# Patient Record
Sex: FEMALE | Race: WHITE | NOT HISPANIC OR LATINO | Employment: FULL TIME | URBAN - METROPOLITAN AREA
[De-identification: names, ages, dates, MRNs, and addresses within clinical notes are randomized per-mention and may not be internally consistent; named-entity substitution may affect disease eponyms.]

---

## 2017-01-01 ENCOUNTER — LAB CONVERSION - ENCOUNTER (OUTPATIENT)
Dept: OTHER | Facility: OTHER | Age: 42
End: 2017-01-01

## 2017-01-01 LAB
ADDITIONAL INFORMATION (HISTORICAL): NORMAL
DIAGNOSIS (HISTORICAL): NORMAL
PATHOLOGIST (HISTORICAL): NORMAL
PROCEDURE (HISTORICAL): NORMAL
SITE/SOURCE (HISTORICAL): NORMAL

## 2017-01-13 ENCOUNTER — GENERIC CONVERSION - ENCOUNTER (OUTPATIENT)
Dept: OTHER | Facility: OTHER | Age: 42
End: 2017-01-13

## 2017-01-30 ENCOUNTER — GENERIC CONVERSION - ENCOUNTER (OUTPATIENT)
Dept: OTHER | Facility: OTHER | Age: 42
End: 2017-01-30

## 2017-02-01 ENCOUNTER — GENERIC CONVERSION - ENCOUNTER (OUTPATIENT)
Dept: OTHER | Facility: OTHER | Age: 42
End: 2017-02-01

## 2017-02-08 ENCOUNTER — GENERIC CONVERSION - ENCOUNTER (OUTPATIENT)
Dept: OTHER | Facility: OTHER | Age: 42
End: 2017-02-08

## 2017-02-28 ENCOUNTER — GENERIC CONVERSION - ENCOUNTER (OUTPATIENT)
Dept: OTHER | Facility: OTHER | Age: 42
End: 2017-02-28

## 2018-01-09 NOTE — RESULT NOTES
Message  Reviewed results with pt, she will f/u with her PCP      Signatures   Electronically signed by : ISELA Bright; Feb 26 2017 10:42PM EST                       (Author)

## 2018-01-15 NOTE — RESULT NOTES
Message  Left message for pt to call back, would refer to her PCP for increased creatinine and decreased GFR        Signatures   Electronically signed by : ISELA Ruth; Feb 24 2017  8:30AM EST                       (Author)

## 2018-01-16 NOTE — MISCELLANEOUS
Message  pt called questioning results of biopsy  "" dr Dom Awad tasked asking that she contact pt & discuss results  Active Problems    1  Abnormal uterine bleeding (AUB) (626 9) (N93 9)   2  Bacterial vaginosis (616 10,041 9) (N76 0,B96 89)   3  Encounter for gynecological examination with abnormal finding (V72 31) (Z01 411)   4  Encounter for screening mammogram for malignant neoplasm of breast (V76 12)   (Z12 31)   5  Encounter to discuss test results (V65 49) (Z71 89)   6  Mental health disorder (300 9) (F99)   7  Oral herpes simplex infection (054 2) (B00 2)    Current Meds   1  ALPRAZolam 0 25 MG Oral Tablet; Therapy: (Recorded:26Esf8717) to Recorded   2  MetroNIDAZOLE 0 75 % Vaginal Gel; insert one applicatorful per vagina at HS x 5; Therapy: 37AES2137 to (Evaluate:52Nqg9061)  Requested for: 99VFB6127; Last   Rx:12Ilc2978 Ordered   3  Sertraline HCl - 25 MG Oral Tablet; Therapy: (Recorded:35Lbv2652) to Recorded   4  ValACYclovir HCl - 1 GM Oral Tablet; Therapy: (Recorded:91Rcb6558) to Recorded    Allergies    1   No Known Drug Allergies    Signatures   Electronically signed by : Antione Mcwilliams RN; Jan 30 2017  4:07PM EST                       (Author)

## 2018-01-17 NOTE — MISCELLANEOUS
Message   Recorded as Task   Date: 01/25/2017 04:03 PM, Created By: Tatianna Mcclain   Task Name: Care Coordination   Assigned To: [facility] Team   Regarding Patient: Lucero Barrera, Status: Active   CommentShanda Liter - 25 Jan 2017 4:03 PM     TASK CREATED  Pt  recently lost insurance and cannot afford to pay out of pocket for f/u visit to review EMB results  Could someone please call her at [phone]                                 OR  Task RN box and one of us will call her and give her results and required f/u   ShareeNorma - 30 Jan 2017 1:54 PM     TASK REPLIED TO: Previously Assigned To [facility] Team  Her EMB results came back as proliferative endometrium  No dysplasia and no malignancy  Likely her abnormal uterine bleeding would benefit from having an IUD placed as was discussed at her previous appointment  Would also suggest to patient to meet with our SW and Financial Counselors regarding applying for medical assistance  Uma Jason - 31 Jan 2017 11:59 AM     TASK REASSIGNED: Previously Assigned To [facility] - 01 Feb 2017 8:25 AM     TASK EDITED  attempted to call pt - L/M for pt to return call to [facility] today until 430pm   SeleneJan - 01 Feb 2017 1:15 PM     TASK EDITED  pt returned call,  results given, per dr Mirta Dowling  pt stated "wants burning of inside of uterine lining done, instead of IUD"  suggested pt call c & schedule an apt to discuss alternative method to assist with AUB  Active Problems    1  Abnormal uterine bleeding (AUB) (626 9) (N93 9)   2  Bacterial vaginosis (616 10,041 9) (N76 0,B96 89)   3  Encounter for gynecological examination with abnormal finding (V72 31) (Z01 411)   4  Encounter for screening mammogram for malignant neoplasm of breast (V76 12)   (Z12 31)   5  Encounter to discuss test results (V65 49) (Z71 89)   6  Mental health disorder (300 9) (F99)   7   Oral herpes simplex infection (054 2) (B00 2)    Current Meds   1  ALPRAZolam 0 25 MG Oral Tablet; Therapy: (Recorded:15Vju6763) to Recorded   2  MetroNIDAZOLE 0 75 % Vaginal Gel; insert one applicatorful per vagina at HS x 5; Therapy: 37EPP5118 to (Evaluate:33Ghl7529)  Requested for: 53HQD5506; Last   Rx:95Eoz0112 Ordered   3  Sertraline HCl - 25 MG Oral Tablet; Therapy: (Recorded:74Zav3023) to Recorded   4  ValACYclovir HCl - 1 GM Oral Tablet; Therapy: (Recorded:49Gdi6685) to Recorded    Allergies    1   No Known Drug Allergies    Signatures   Electronically signed by : Conor Coppola RN; Feb 1 2017  1:16PM EST                       (Author)

## 2018-01-17 NOTE — MISCELLANEOUS
Message  returned pts' p c - L/M for pt to call office prior to 430pm today (pt requesting bldwk fax'd to PCP office)      Active Problems    1  Abnormal uterine bleeding (AUB) (626 9) (N93 9)   2  Bacterial vaginosis (616 10,041 9) (N76 0,B96 89)   3  Encounter for gynecological examination with abnormal finding (V72 31) (Z01 411)   4  Encounter for screening mammogram for malignant neoplasm of breast (V76 12)   (Z12 31)   5  Encounter to discuss test results (V65 49) (Z71 89)   6  Mental health disorder (300 9) (F99)   7  Oral herpes simplex infection (054 2) (B00 2)    Current Meds   1  ALPRAZolam 0 25 MG Oral Tablet; Therapy: (Recorded:67Njh7767) to Recorded   2  MetroNIDAZOLE 0 75 % Vaginal Gel; insert one applicatorful per vagina at HS x 5; Therapy: 17RGC9912 to (Evaluate:73Rpe3832)  Requested for: 22ICI6511; Last   Rx:46Rqm0537 Ordered   3  Sertraline HCl - 25 MG Oral Tablet; Therapy: (Recorded:89Caw1567) to Recorded   4  ValACYclovir HCl - 1 GM Oral Tablet; Therapy: (Recorded:53Kcu2734) to Recorded    Allergies    1   No Known Drug Allergies    Signatures   Electronically signed by : Rosanna Lainez RN; Feb 28 2017  1:13PM EST                       (Author)

## 2018-01-18 NOTE — MISCELLANEOUS
Message   Recorded as Task   Date: 01/25/2017 04:03 PM, Created By: Corrina Bradford   Task Name: Care Coordination   Assigned To: [facility] Team   Regarding Patient: Alex Bear, Status: Active   CommentCherlykenny [facility] - 25 Jan 2017 4:03 PM     TASK CREATED  Pt  recently lost insurance and cannot afford to pay out of pocket for f/u visit to review EMB results  Could someone please call her at [phone]                                 OR  Task RN box and one of us will call her and give her results and required f/u   Norma Tolliver - 30 Jan 2017 1:54 PM     TASK REPLIED TO: Previously Assigned To [address] [place] [address] Team  Her EMB results came back as proliferative endometrium  No dysplasia and no malignancy  Likely her abnormal uterine bleeding would benefit from having an IUD placed as was discussed at her previous appointment  Would also suggest to patient to meet with our SW and Financial Counselors regarding applying for medical assistance  Uma Jason - 31 Jan 2017 11:59 AM     TASK REASSIGNED: Previously Assigned To [facility] - 01 Feb 2017 8:25 AM     TASK EDITED  attempted to call pt - L/M for pt to return call to [facility] today until 430pm        Active Problems    1  Abnormal uterine bleeding (AUB) (626 9) (N93 9)   2  Bacterial vaginosis (616 10,041 9) (N76 0,B96 89)   3  Encounter for gynecological examination with abnormal finding (V72 31) (Z01 411)   4  Encounter for screening mammogram for malignant neoplasm of breast (V76 12)   (Z12 31)   5  Encounter to discuss test results (V65 49) (Z71 89)   6  Mental health disorder (300 9) (F99)   7  Oral herpes simplex infection (054 2) (B00 2)    Current Meds   1  ALPRAZolam 0 25 MG Oral Tablet; Therapy: (Recorded:08Vmt9046) to Recorded   2  MetroNIDAZOLE 0 75 % Vaginal Gel; insert one applicatorful per vagina at HS x 5;    Therapy: 91GKL0517 to (Evaluate:94Mst3850)  Requested for: 91QZZ2716; Last   Rx:18Lei6657 Ordered   3  Sertraline HCl - 25 MG Oral Tablet; Therapy: (Recorded:07Gtf5278) to Recorded   4  ValACYclovir HCl - 1 GM Oral Tablet; Therapy: (Recorded:08Qfq7285) to Recorded    Allergies    1   No Known Drug Allergies    Signatures   Electronically signed by : Nava Marroquin RN; Feb 1 2017  8:25AM EST                       (Author)

## 2018-01-18 NOTE — MISCELLANEOUS
Message  Patient was a no show on 1/13/2017 for result fup  Called patient, no answer, left message for patient to call to   reschedule appt  Active Problems    1  Abnormal uterine bleeding (AUB) (626 9) (N93 9)   2  Bacterial vaginosis (616 10,041 9) (N76 0,B96 89)   3  Encounter for gynecological examination with abnormal finding (V72 31) (Z01 411)   4  Encounter for screening mammogram for malignant neoplasm of breast (V76 12)   (Z12 31)   5  Encounter to discuss test results (V65 49) (Z71 89)   6  Mental health disorder (300 9) (F99)   7  Oral herpes simplex infection (054 2) (B00 2)    Current Meds   1  ALPRAZolam 0 25 MG Oral Tablet; Therapy: (Recorded:89Rvl8160) to Recorded   2  MetroNIDAZOLE 0 75 % Vaginal Gel; insert one applicatorful per vagina at HS x 5; Therapy: 52IXZ5274 to (Evaluate:58Abq1302)  Requested for: 21KFY6583; Last   Rx:[id number] Ordered   3  Sertraline HCl - 25 MG Oral Tablet; Therapy: (Recorded:14Hmj9704) to Recorded   4  ValACYclovir HCl - 1 GM Oral Tablet; Therapy: (Recorded:44Egp4186) to Recorded    Allergies    1   No Known Drug Allergies    Signatures   Electronically signed by : Tara Gilbert, ; Jan 13 2017 12:03PM EST                       (Author)

## 2019-04-03 ENCOUNTER — OFFICE VISIT (OUTPATIENT)
Dept: OBGYN CLINIC | Facility: CLINIC | Age: 44
End: 2019-04-03

## 2019-04-03 VITALS
HEART RATE: 98 BPM | WEIGHT: 115 LBS | DIASTOLIC BLOOD PRESSURE: 82 MMHG | BODY MASS INDEX: 18.05 KG/M2 | SYSTOLIC BLOOD PRESSURE: 124 MMHG | HEIGHT: 67 IN

## 2019-04-03 DIAGNOSIS — Z01.419 WOMEN'S ANNUAL ROUTINE GYNECOLOGICAL EXAMINATION: Primary | ICD-10-CM

## 2019-04-03 DIAGNOSIS — Z12.39 BREAST CANCER SCREENING: ICD-10-CM

## 2019-04-03 PROBLEM — F10.10 ETOH ABUSE: Status: ACTIVE | Noted: 2019-03-27

## 2019-04-03 PROCEDURE — 87624 HPV HI-RISK TYP POOLED RSLT: CPT | Performed by: NURSE PRACTITIONER

## 2019-04-03 PROCEDURE — S0612 ANNUAL GYNECOLOGICAL EXAMINA: HCPCS | Performed by: NURSE PRACTITIONER

## 2019-04-03 PROCEDURE — G0145 SCR C/V CYTO,THINLAYER,RESCR: HCPCS | Performed by: NURSE PRACTITIONER

## 2019-04-03 RX ORDER — VALACYCLOVIR HYDROCHLORIDE 500 MG/1
TABLET, FILM COATED ORAL
COMMUNITY

## 2019-04-03 RX ORDER — ALPRAZOLAM 0.25 MG/1
TABLET ORAL
COMMUNITY
Start: 2019-04-02

## 2019-04-04 LAB
HPV HR 12 DNA CVX QL NAA+PROBE: NEGATIVE
HPV16 DNA CVX QL NAA+PROBE: NEGATIVE
HPV18 DNA CVX QL NAA+PROBE: NEGATIVE

## 2019-04-07 LAB
LAB AP GYN PRIMARY INTERPRETATION: NORMAL
Lab: NORMAL
PATH INTERP SPEC-IMP: NORMAL

## 2019-04-29 ENCOUNTER — HOSPITAL ENCOUNTER (OUTPATIENT)
Dept: MAMMOGRAPHY | Facility: MEDICAL CENTER | Age: 44
Discharge: HOME | End: 2019-04-29
Payer: COMMERCIAL

## 2019-04-29 VITALS — BODY MASS INDEX: 18.52 KG/M2 | HEIGHT: 67 IN | WEIGHT: 118 LBS

## 2019-04-29 DIAGNOSIS — Z12.39 BREAST CANCER SCREENING: ICD-10-CM

## 2019-04-29 PROCEDURE — 77063 BREAST TOMOSYNTHESIS BI: CPT

## 2019-04-29 PROCEDURE — 77067 SCR MAMMO BI INCL CAD: CPT

## 2019-07-01 ENCOUNTER — OFFICE VISIT (OUTPATIENT)
Dept: OBGYN CLINIC | Facility: CLINIC | Age: 44
End: 2019-07-01

## 2019-07-01 VITALS
SYSTOLIC BLOOD PRESSURE: 119 MMHG | WEIGHT: 115 LBS | BODY MASS INDEX: 18.48 KG/M2 | DIASTOLIC BLOOD PRESSURE: 66 MMHG | HEART RATE: 87 BPM | HEIGHT: 66 IN

## 2019-07-01 DIAGNOSIS — N93.9 ABNORMAL UTERINE BLEEDING: Primary | ICD-10-CM

## 2019-07-01 PROCEDURE — 88305 TISSUE EXAM BY PATHOLOGIST: CPT | Performed by: PATHOLOGY

## 2019-07-01 PROCEDURE — 58100 BIOPSY OF UTERUS LINING: CPT | Performed by: OBSTETRICS & GYNECOLOGY

## 2019-07-01 PROCEDURE — 99214 OFFICE O/P EST MOD 30 MIN: CPT | Performed by: OBSTETRICS & GYNECOLOGY

## 2019-07-01 RX ORDER — ACETAMINOPHEN AND CODEINE PHOSPHATE 120; 12 MG/5ML; MG/5ML
1 SOLUTION ORAL DAILY
Qty: 30 TABLET | Refills: 2 | Status: SHIPPED | OUTPATIENT
Start: 2019-07-01

## 2019-07-01 NOTE — PROGRESS NOTES
Subjective    Devoria Brunner is a 40 y o  female here for discussion of abnormal uterine bleeding  Menarche started at [age]  She has always had regular menses, lasting 5 days, heavier in the first few days, every 28-30 days up until last summer  Last year, she had intermittent irregular menstrual bleeding  She stated that she was consented for an endometrial ablation but due to difficulty with insurance coverage, did not proceed  Her menstrual cycle regulated so she did not seek further care  Griffiths Marino is here because she has been having menses for the past 2 months, since 2019  It is heavy and filled with clots requiring multiple pad changes per day  She denies dizziness, nausea/vomiting, headache, vision changes, abdominopelvic pain  She is currently not on contraception  She declines all types of contraception today although she is not using protection during intercourse and does not desire pregnancy  Gynecologic History  Patient's last menstrual period was 2019 (lmp unknown)  Contraception: none  Last Pap: 4/3/2019  Results were: normal  Last mammogram: 2019  Results were: normal (biRad 1)    Obstetric History  OB History    Para Term  AB Living   2   0   2 0   SAB TAB Ectopic Multiple Live Births                  # Outcome Date GA Lbr John/2nd Weight Sex Delivery Anes PTL Lv   2 AB            1 AB                Review of Systems  Pertinent items are noted in HPI  Objective  Vitals:    19 1153   BP: 119/66   Pulse: 87       Physical Exam   Constitutional: She is oriented to person, place, and time  She appears well-developed and well-nourished  Cardiovascular: Normal rate, regular rhythm and normal heart sounds  Pulmonary/Chest: Effort normal and breath sounds normal    Abdominal: Soft  Bowel sounds are normal    Genitourinary:   Genitourinary Comments: No abnormalities visualized on external genitalia  Urethra midline  No vaginal lesions, abrasions, masses  Cervix nulliparous in appearance  Dark-red blood pooling the posterior fornix including the vaginal sidewalls  Neurological: She is alert and oriented to person, place, and time  Vitals reviewed  Endometrial biopsy  Date/Time: 7/1/2019 2:47 PM  Performed by: Drew Birch MD  Authorized by: Drew Birch MD     Consent:     Consent obtained:  Verbal and written    Consent given by:  Patient    Procedural risks discussed:  Bleeding, failure rate, infection, possible continued pain and repeat procedure    Patient questions answered: yes      Patient agrees, verbalizes understanding, and wants to proceed: yes      Educational handouts given: no      Instructions and paperwork completed: yes    Indication:     Indications: Other disorder of menstruation and other abnormal bleeding from female genital tract    Pre-procedure:     Pre-procedure timeout performed: yes    Procedure:     Procedure: endometrial biopsy with Pipelle      A bivalve speculum was placed in the vagina: yes      Cervix cleaned and prepped: yes      A paracervical block was performed: no      An intracervical block was performed: no      The cervix was dilated: no      Uterus sounded: yes      Uterus sound depth (cm):  6    Specimen collected: specimen collected and sent to pathology    Findings:     Uterus size:  Non-gravid    Cervix: normal      Adnexa: normal        Assessment    Patient is a 39yo G0 female here for workup of abnormal uterine bleeding  Plan    Problem List Items Addressed This Visit        Genitourinary    Abnormal uterine bleeding - Primary     Causes of abnormal uterine bleeding includes structural abnormalities (poylps, adenomyosis, leiomyoma, malignancy) and nonstructural causes (coagulopathy, ovulatory dysfunction, endometrial, iatrogenic, or not otherwise classified)  Endometrial biopsy completed today to rule out malignancy, will follow up with results   Reviewed labs from 6/26, no sign of anemia or thyroid dysfunction  Patient to return to clinic in 1 week for SIS to visualize structural abnormalities  Since she smokes 1 pack of cigarettes/day, she is not a candidate for combination estrogen/progesterone therapy  Discussed oral contraceptives, depo provera injection, and Mirena as medical options  She would like to start micronor today  Once results of SIS and EMB are back, will re-discuss medication vs surgical options (including endometrial ablation or hysterectomy)  Patient discussed with Dr Lance Soni, who was present for EMB and is in agreement with plan           Relevant Medications    norethindrone (MICRONOR) 0 35 MG tablet    Other Relevant Orders    Tissue Exam

## 2019-07-01 NOTE — ASSESSMENT & PLAN NOTE
Causes of abnormal uterine bleeding includes structural abnormalities (poylps, adenomyosis, leiomyoma, malignancy) and nonstructural causes (coagulopathy, ovulatory dysfunction, endometrial, iatrogenic, or not otherwise classified)  Endometrial biopsy completed today to rule out malignancy, will follow up with results  Reviewed labs from 6/26, no sign of anemia or thyroid dysfunction  Patient to return to clinic in 1 week for SIS to visualize structural abnormalities  Since she smokes 1 pack of cigarettes/day, she is not a candidate for combination estrogen/progesterone therapy  Discussed oral contraceptives, depo provera injection, and Mirena as medical options  She would like to start micronor today  Once results of SIS and EMB are back, will re-discuss medication vs surgical options (including endometrial ablation or hysterectomy)  Patient discussed with Dr Meghna Fierro, who was present for EMB and is in agreement with plan

## 2019-07-08 ENCOUNTER — PROCEDURE VISIT (OUTPATIENT)
Dept: OBGYN CLINIC | Facility: CLINIC | Age: 44
End: 2019-07-08

## 2019-07-08 VITALS
WEIGHT: 114 LBS | SYSTOLIC BLOOD PRESSURE: 128 MMHG | HEART RATE: 96 BPM | DIASTOLIC BLOOD PRESSURE: 83 MMHG | BODY MASS INDEX: 18.32 KG/M2 | HEIGHT: 66 IN

## 2019-07-08 DIAGNOSIS — N93.9 ABNORMAL UTERINE BLEEDING: Primary | ICD-10-CM

## 2019-07-08 PROCEDURE — 58340 CATHETER FOR HYSTEROGRAPHY: CPT | Performed by: OBSTETRICS & GYNECOLOGY

## 2019-07-08 PROCEDURE — 76856 US EXAM PELVIC COMPLETE: CPT | Performed by: OBSTETRICS & GYNECOLOGY

## 2019-07-08 PROCEDURE — 99214 OFFICE O/P EST MOD 30 MIN: CPT | Performed by: OBSTETRICS & GYNECOLOGY

## 2019-07-08 NOTE — ASSESSMENT & PLAN NOTE
SIS completed today, findings as noted above  Will send patient for formal pelvic ultrasound to better define the left ovarian cyst, fibroid, and endometrial cavity  I briefly discussed medical and surgical options pending the ultrasound results including continuing micronor use, depo provera injection, Mirena, endometrial ablation, and hysterectomy  Patient to make an appointment after completion of ultrasound for further discussion

## 2019-07-08 NOTE — PROGRESS NOTES
Subjective:     James Araujo is a 40 y o   female here for continued evaluation of abnormal uterine bleeding  Patient was first evaluated 19  EMB showed disordered proliferative-type endometrium with focal endometrial breakdown/menstrual-type changes with no atypia nor malignancy seen  Patient is here for SIS to assess for structural causes of AUB including polyps, adenomyosis, and leiomyoma  Patient is doing well but still having menses  LMP 19  She started her micronor last week - she has noticed decreased clot production but still has vaginal bleeding  She is very anxious about the procedure today  Objective:    Vitals: Blood pressure 128/83, pulse 96, height 5' 6" (1 676 m), weight 51 7 kg (114 lb), last menstrual period 2019, not currently breastfeeding  Body mass index is 18 4 kg/m²  Physical Exam   Constitutional: She is oriented to person, place, and time  She appears well-developed and well-nourished  Cardiovascular: Normal rate, regular rhythm and normal heart sounds  Pulmonary/Chest: Effort normal    Abdominal: Soft  Bowel sounds are normal  She exhibits no distension and no mass  There is no tenderness  There is no rebound and no guarding  No hernia  Genitourinary:   Genitourinary Comments: Bimanual exam: nulliparous cervix, no palpable masses, negative CMT, no adnexal tenderness or masses   Neurological: She is alert and oriented to person, place, and time        Sonohysterogram  Date/Time: 2019 1:36 PM  Performed by: Davin Herbert MD  Authorized by: Davin Herbert MD     Consent:     Consent obtained:  Verbal and written    Consent given by:  Patient    Procedural risks discussed:  Bleeding, failure rate, infection, possible continued pain and repeat procedure    Patient questions answered: yes      Patient agrees, verbalizes understanding, and wants to proceed: yes      Instructions and paperwork completed: yes    Pre-procedure:     Pre-procedure timeout performed: yes    Procedure:     Cervix cleaned and prepped: yes      Cervix dilated: no      Tenaculum applied to cervix: no      Uterus sounded: no      Catheter inserted: yes      Uterine cavity distended with saline: yes    Post-procedure:     Patient observed: yes      Patient observation time:  10 minutes    No complications: no      Estimated blood loss (mL):  0    Post procedure instructions given to patient: yes      Nothing in vagina for 2 weeks: no      Patient tolerated procedure well with no complications: yes        SIS findings: Thin endometrial lining 5 3mm  5 15 x 6 52cm posterior fibroid   Left ovarian cyst, thin walled with septations, measuring 2 64x3 91cm  No abnormalities noted on right ovary  No apparent polyps or submucosal fibroids visualized    Assessment/Plan:    Problem List Items Addressed This Visit        Genitourinary    Abnormal uterine bleeding - Primary     SIS completed today, findings as noted above  Will send patient for formal pelvic ultrasound to better define the left ovarian cyst, fibroid, and endometrial cavity  I briefly discussed medical and surgical options pending the ultrasound results including continuing micronor use, depo provera injection, Mirena, endometrial ablation, and hysterectomy  Patient to make an appointment after completion of ultrasound for further discussion           Relevant Orders    US pelvis complete non OB          Dr Kelsi Price present during SIS and counseling regarding management options     Salima Macedo MD  7/8/2019  1:53 PM

## 2019-07-15 ENCOUNTER — HOSPITAL ENCOUNTER (OUTPATIENT)
Dept: RADIOLOGY | Facility: HOSPITAL | Age: 44
Discharge: HOME | End: 2019-07-15
Payer: COMMERCIAL

## 2019-07-15 DIAGNOSIS — N93.9 ABNORMAL UTERINE BLEEDING: ICD-10-CM

## 2019-07-15 PROCEDURE — 76856 US EXAM PELVIC COMPLETE: CPT

## 2019-07-15 PROCEDURE — 76830 TRANSVAGINAL US NON-OB: CPT

## 2019-07-19 DIAGNOSIS — N83.8 OVARIAN MASS, LEFT: Primary | ICD-10-CM

## 2019-07-22 ENCOUNTER — OFFICE VISIT (OUTPATIENT)
Dept: OBGYN CLINIC | Facility: CLINIC | Age: 44
End: 2019-07-22

## 2019-07-22 VITALS
DIASTOLIC BLOOD PRESSURE: 80 MMHG | HEIGHT: 66 IN | BODY MASS INDEX: 18.8 KG/M2 | SYSTOLIC BLOOD PRESSURE: 131 MMHG | WEIGHT: 117 LBS | HEART RATE: 91 BPM

## 2019-07-22 DIAGNOSIS — N83.202 LEFT OVARIAN CYST: Primary | ICD-10-CM

## 2019-07-22 DIAGNOSIS — Z71.2 ENCOUNTER TO DISCUSS TEST RESULTS: ICD-10-CM

## 2019-07-22 PROCEDURE — 99213 OFFICE O/P EST LOW 20 MIN: CPT | Performed by: OBSTETRICS & GYNECOLOGY

## 2019-07-22 NOTE — PROGRESS NOTES
I reviewed the pelvic ultrasound that was completed 7/15/2019  Notable findings:  Uterus 9 4x4  3x4 8cm  Large right sided uterine fibroid measuring 5 4x4 6x6cm  Posterior uterine fibroids measuring 1x0 9x0 9cm and 1 8x1 6x1cm  Normal endometrial caliber of 5mm  Normal right ovary    Left ovary 5 4x5 8x5 4cm with indeterminate cyst measuring 5 1x5 3x4 1cm containing a solid component  I reviewed the ultrasound findings with Dr Meghna Fierro  Patient needs to make an appointment to discuss removal of left adnexa given ultrasound findings  Given that she presented for year long history of abnormal uterine bleeding with multiple fibroids on ultrasound, discussion regarding hysterectomy should be considered  No further imaging needs to be completed as it will not change the course of management  Front staff tasked with setting up appointment       Don Hernández MD  OBGYN, PGY-3  7/22/2019  9:19 AM

## 2019-09-13 DIAGNOSIS — N93.9 ABNORMAL UTERINE BLEEDING: ICD-10-CM

## 2019-09-17 RX ORDER — NORETHINDRONE 0.35 MG/1
TABLET ORAL
Qty: 84 TABLET | Refills: 0 | OUTPATIENT
Start: 2019-09-17

## 2019-09-18 ENCOUNTER — OFFICE VISIT (OUTPATIENT)
Dept: OBGYN CLINIC | Facility: CLINIC | Age: 44
End: 2019-09-18

## 2019-09-18 VITALS
SYSTOLIC BLOOD PRESSURE: 121 MMHG | DIASTOLIC BLOOD PRESSURE: 75 MMHG | HEART RATE: 91 BPM | WEIGHT: 122 LBS | BODY MASS INDEX: 19.61 KG/M2 | HEIGHT: 66 IN

## 2019-09-18 DIAGNOSIS — N83.202 LEFT OVARIAN CYST: Primary | ICD-10-CM

## 2019-09-18 DIAGNOSIS — F17.200 TOBACCO USE DISORDER: ICD-10-CM

## 2019-09-18 DIAGNOSIS — F10.10 ETOH ABUSE: ICD-10-CM

## 2019-09-18 DIAGNOSIS — N93.9 ABNORMAL UTERINE BLEEDING: ICD-10-CM

## 2019-09-18 PROCEDURE — 99214 OFFICE O/P EST MOD 30 MIN: CPT | Performed by: OBSTETRICS & GYNECOLOGY

## 2019-09-18 NOTE — H&P (VIEW-ONLY)
H&P Exam - Gynecology   Serjio Jaimes 40 y o  female MRN: [id number]  Unit/Bed#:  Encounter: [id number]      History of Present Illness     HPI:  Serjio Jaimes is a 40 y o  female who presents with left ovarian mass with solid component which was detected incidentally on transvaginal ultrasound  Patient  was 4  Patient primary complaint once menorrhagia which started on May 2019 and similar complaints was a year ago for 2 months  Patient is complaining of lower abdominal pain and cramping when she is having bleeding  Micronor has been prescribed for menorrhagia which is helping for reducing the bleeding  She decline menorrhagia currently and lesser pelvic pain  Patient has had endometrial biopsy which was reported as proliferative endometrium  Patient Pap smear on  was reported as negative for intraepithelial neoplasia  Laparoscopic left cystectomy or left salpingo-oophorectomy and bilateral salpingectomy planned patient understand the possible risk for infection, bleeding, possible neighbor organ damage, nerve injury, and possible extra surgery to repair the damage  Possible open surgery  Review of Systems   Constitutional: Negative  HENT: Negative  Respiratory: Negative  Cardiovascular: Negative  Gastrointestinal: Negative  Genitourinary: Negative  Musculoskeletal: Negative  Neurological: Negative  Psychiatric/Behavioral: Negative          Historical Information   Past Medical History:   Diagnosis Date    Anxiety disorder     HSV infection      Past Surgical History:   Procedure Laterality Date    THORACOTOMY Left      OB/GYN History:   Family History   Problem Relation Age of Onset   Wilkinson Drop COPD Mother    Wilkinson Drop Stroke Father     Hyperlipidemia Father     Hypertension Father     Heart attack Father     No Known Problems Brother     Cancer Paternal Aunt      Social History   Social History     Substance and Sexual Activity   Alcohol Use Yes    Frequency: 2-3 times a week     Social History     Substance and Sexual Activity   Drug Use Never     Social History     Tobacco Use   Smoking Status Current Some Day Smoker    Packs/day: 1 00    Types: Cigarettes   Smokeless Tobacco Never Used       Meds/Allergies     (Not in a hospital admission)  No Known Allergies    Objective   There were no vitals taken for this visit  [unfilled]    Physical Exam   Constitutional: She is oriented to person, place, and time  She appears well-developed and well-nourished  No distress  Cardiovascular: Normal rate and regular rhythm  Pulmonary/Chest: Effort normal    Abdominal: Soft  Musculoskeletal: Normal range of motion  Neurological: She is alert and oriented to person, place, and time  Skin: Skin is warm  She is not diaphoretic  Psychiatric: She has a normal mood and affect  Her behavior is normal        Lab Results:   No visits with results within 1 Day(s) from this visit  Latest known visit with results is:   Office Visit on 07/01/2019   Component Date Value    Case Report 07/01/2019                      Value:Surgical Pathology Report                         Case: [id number]                                   Authorizing Provider:  Nataliya Kellogg MD            Collected:           07/01/2019 1244              Ordering Location:     [facility]      Received:            07/01/2019 [address] [place] [address]                                                             Pathologist:           Adan Gilmore MD                                                            Specimen:    Endometrium, EMB                                                                           Final Diagnosis 07/01/2019                      Value: This result contains rich text formatting which cannot be displayed here   Additional Information 07/01/2019                      Value: This result contains rich text formatting which cannot be displayed here      Gross Description 07/01/2019                      Value: This result contains rich text formatting which cannot be displayed here   Clinical Information 07/01/2019                      Value:AUB      Imaging: I have personally reviewed pertinent reports  EKG, Pathology, and Other Studies: I have personally reviewed pertinent reports        Assessment/Plan     A/P:  Left-sided adnexal mass with solid component, menorrhagia  - laparoscopic surgery scheduled with indication of left-sided adnexal mass with solid component, Laparoscopic bilateral salpingectomy, left cystectomy or left salpingo-oophorectomy  - surgical consent signed by patient, all questions answered  - patient will continue use Micronor for menorrhagia, MIRENA  intrauterine device discussed for a long-term option  - tobacco use; cessation of smoking recommended, anesthesia related complication with the surgery discussed  - alcohol abuse; alcohol cessation recommended    Code Status: full code    María Valladares MD  8/42/5469  4:96 PM

## 2019-09-18 NOTE — PROGRESS NOTES
H&P Exam - Gynecology   Nikky Tracey 40 y o  female MRN: [id number]  Unit/Bed#:  Encounter: [id number]      History of Present Illness     HPI:  Nikky Tracey is a 40 y o  female who presents with left ovarian mass with solid component which was detected incidentally on transvaginal ultrasound  Patient  was 4  Patient primary complaint once menorrhagia which started on May 2019 and similar complaints was a year ago for 2 months  Patient is complaining of lower abdominal pain and cramping when she is having bleeding  Micronor has been prescribed for menorrhagia which is helping for reducing the bleeding  She decline menorrhagia currently and lesser pelvic pain  Patient has had endometrial biopsy which was reported as proliferative endometrium  Patient Pap smear on  was reported as negative for intraepithelial neoplasia  Laparoscopic left cystectomy or left salpingo-oophorectomy and bilateral salpingectomy planned patient understand the possible risk for infection, bleeding, possible neighbor organ damage, nerve injury, and possible extra surgery to repair the damage  Possible open surgery  Review of Systems   Constitutional: Negative  HENT: Negative  Respiratory: Negative  Cardiovascular: Negative  Gastrointestinal: Negative  Genitourinary: Negative  Musculoskeletal: Negative  Neurological: Negative  Psychiatric/Behavioral: Negative          Historical Information   Past Medical History:   Diagnosis Date    Anxiety disorder     HSV infection      Past Surgical History:   Procedure Laterality Date    THORACOTOMY Left      OB/GYN History:   Family History   Problem Relation Age of Onset   Aleja Wong COPD Mother    Aleja Wong Stroke Father     Hyperlipidemia Father     Hypertension Father     Heart attack Father     No Known Problems Brother     Cancer Paternal Aunt      Social History   Social History     Substance and Sexual Activity   Alcohol Use Yes    Frequency: 2-3 times a week     Social History     Substance and Sexual Activity   Drug Use Never     Social History     Tobacco Use   Smoking Status Current Some Day Smoker    Packs/day: 1 00    Types: Cigarettes   Smokeless Tobacco Never Used       Meds/Allergies     (Not in a hospital admission)  No Known Allergies    Objective   There were no vitals taken for this visit  [unfilled]    Physical Exam   Constitutional: She is oriented to person, place, and time  She appears well-developed and well-nourished  No distress  Cardiovascular: Normal rate and regular rhythm  Pulmonary/Chest: Effort normal    Abdominal: Soft  Musculoskeletal: Normal range of motion  Neurological: She is alert and oriented to person, place, and time  Skin: Skin is warm  She is not diaphoretic  Psychiatric: She has a normal mood and affect  Her behavior is normal        Lab Results:   No visits with results within 1 Day(s) from this visit  Latest known visit with results is:   Office Visit on 07/01/2019   Component Date Value    Case Report 07/01/2019                      Value:Surgical Pathology Report                         Case: [id number]                                   Authorizing Provider:  Rom Prabhakar MD            Collected:           07/01/2019 1244              Ordering Location:     [facility]      Received:            07/01/2019 [address] [place] [address]                                                             Pathologist:           Pretty Powell MD                                                            Specimen:    Endometrium, EMB                                                                           Final Diagnosis 07/01/2019                      Value: This result contains rich text formatting which cannot be displayed here   Additional Information 07/01/2019                      Value: This result contains rich text formatting which cannot be displayed here      Gross Description 07/01/2019                      Value: This result contains rich text formatting which cannot be displayed here   Clinical Information 07/01/2019                      Value:AUB      Imaging: I have personally reviewed pertinent reports  EKG, Pathology, and Other Studies: I have personally reviewed pertinent reports        Assessment/Plan     A/P:  Left-sided adnexal mass with solid component, menorrhagia  - laparoscopic surgery scheduled with indication of left-sided adnexal mass with solid component, Laparoscopic bilateral salpingectomy, left cystectomy or left salpingo-oophorectomy  - surgical consent signed by patient, all questions answered  - patient will continue use Micronor for menorrhagia, MIRENA  intrauterine device discussed for a long-term option  - tobacco use; cessation of smoking recommended, anesthesia related complication with the surgery discussed  - alcohol abuse; alcohol cessation recommended    Code Status: full code    Georgette Orosco MD  9/75/1254  7:72 PM

## 2019-10-08 NOTE — PRE-PROCEDURE INSTRUCTIONS
Pre-Surgery Instructions:   Medication Instructions    ALPRAZolam (XANAX) 0 25 mg tablet Instructed patient per Anesthesia Guidelines   norethindrone (MICRONOR) 0 35 MG tablet Instructed patient per Anesthesia Guidelines   sertraline (ZOLOFT) 50 mg tablet Instructed patient per Anesthesia Guidelines  Spoke to pt  Medication list reviewed & instructed   As of 10/8 instructed on tylenol only   Am DOS pt ok to take am meds with 1-2 sips of water   Showering instructions given by surgeon office, reviewed   Smoking cessation education provided  Advised no smoking am DOS  Advised no alcohol 24 hour prior  Pt understands she will need a ride home @ time of discharge  All instructions verbally understood by patient  No further question     Acyclovir Med Class     Continue to take this medication on your normal schedule  If this is an oral medication and you take it in the morning, then you may take this medicine with a sip of water  Antidepressant Med Class     Continue to take this medication on your normal schedule  If this is an oral medication and you take it in the morning, then you may take this medicine with a sip of water  Benzodiazepine antagonist Med Class     If this medication is needed please continue to take on your normal schedule  If you take it in the morning, then you may take this medicine with a sip of water

## 2019-10-10 ENCOUNTER — HOSPITAL ENCOUNTER (OUTPATIENT)
Facility: HOSPITAL | Age: 44
Setting detail: OUTPATIENT SURGERY
Discharge: HOME | End: 2019-10-10
Attending: OBSTETRICS & GYNECOLOGY | Admitting: OBSTETRICS & GYNECOLOGY
Payer: COMMERCIAL

## 2019-10-10 ENCOUNTER — ANESTHESIA EVENT (OUTPATIENT)
Dept: PERIOP | Facility: HOSPITAL | Age: 44
End: 2019-10-10
Payer: COMMERCIAL

## 2019-10-10 ENCOUNTER — ANESTHESIA (OUTPATIENT)
Dept: PERIOP | Facility: HOSPITAL | Age: 44
End: 2019-10-10
Payer: COMMERCIAL

## 2019-10-10 VITALS
DIASTOLIC BLOOD PRESSURE: 76 MMHG | HEART RATE: 89 BPM | OXYGEN SATURATION: 97 % | RESPIRATION RATE: 18 BRPM | WEIGHT: 116 LBS | BODY MASS INDEX: 22.78 KG/M2 | SYSTOLIC BLOOD PRESSURE: 114 MMHG | HEIGHT: 60 IN | TEMPERATURE: 99.7 F

## 2019-10-10 DIAGNOSIS — N83.202 LEFT OVARIAN CYST: Primary | ICD-10-CM

## 2019-10-10 LAB
EXT PREGNANCY TEST URINE: NEGATIVE
EXT. CONTROL: NORMAL

## 2019-10-10 PROCEDURE — 58661 LAPAROSCOPY REMOVE ADNEXA: CPT | Performed by: OBSTETRICS & GYNECOLOGY

## 2019-10-10 PROCEDURE — 88305 TISSUE EXAM BY PATHOLOGIST: CPT | Performed by: PATHOLOGY

## 2019-10-10 PROCEDURE — 81025 URINE PREGNANCY TEST: CPT | Performed by: OBSTETRICS & GYNECOLOGY

## 2019-10-10 PROCEDURE — 88112 CYTOPATH CELL ENHANCE TECH: CPT | Performed by: PATHOLOGY

## 2019-10-10 RX ORDER — ACETAMINOPHEN 325 MG/1
650 TABLET ORAL EVERY 4 HOURS PRN
Status: DISCONTINUED | OUTPATIENT
Start: 2019-10-10 | End: 2019-10-10 | Stop reason: HOSPADM

## 2019-10-10 RX ORDER — LIDOCAINE HYDROCHLORIDE 10 MG/ML
INJECTION, SOLUTION INFILTRATION; PERINEURAL AS NEEDED
Status: DISCONTINUED | OUTPATIENT
Start: 2019-10-10 | End: 2019-10-10 | Stop reason: SURG

## 2019-10-10 RX ORDER — MIDAZOLAM HYDROCHLORIDE 1 MG/ML
INJECTION INTRAMUSCULAR; INTRAVENOUS AS NEEDED
Status: DISCONTINUED | OUTPATIENT
Start: 2019-10-10 | End: 2019-10-10 | Stop reason: SURG

## 2019-10-10 RX ORDER — ONDANSETRON 2 MG/ML
INJECTION INTRAMUSCULAR; INTRAVENOUS AS NEEDED
Status: DISCONTINUED | OUTPATIENT
Start: 2019-10-10 | End: 2019-10-10 | Stop reason: SURG

## 2019-10-10 RX ORDER — PROPOFOL 10 MG/ML
INJECTION, EMULSION INTRAVENOUS AS NEEDED
Status: DISCONTINUED | OUTPATIENT
Start: 2019-10-10 | End: 2019-10-10 | Stop reason: SURG

## 2019-10-10 RX ORDER — FENTANYL CITRATE 50 UG/ML
INJECTION, SOLUTION INTRAMUSCULAR; INTRAVENOUS AS NEEDED
Status: DISCONTINUED | OUTPATIENT
Start: 2019-10-10 | End: 2019-10-10 | Stop reason: SURG

## 2019-10-10 RX ORDER — METOCLOPRAMIDE HYDROCHLORIDE 5 MG/ML
10 INJECTION INTRAMUSCULAR; INTRAVENOUS ONCE AS NEEDED
Status: DISCONTINUED | OUTPATIENT
Start: 2019-10-10 | End: 2019-10-10 | Stop reason: HOSPADM

## 2019-10-10 RX ORDER — SODIUM CHLORIDE, SODIUM LACTATE, POTASSIUM CHLORIDE, CALCIUM CHLORIDE 600; 310; 30; 20 MG/100ML; MG/100ML; MG/100ML; MG/100ML
100 INJECTION, SOLUTION INTRAVENOUS CONTINUOUS
Status: DISCONTINUED | OUTPATIENT
Start: 2019-10-10 | End: 2019-10-10 | Stop reason: HOSPADM

## 2019-10-10 RX ORDER — BUPIVACAINE HYDROCHLORIDE 2.5 MG/ML
INJECTION, SOLUTION INFILTRATION; PERINEURAL AS NEEDED
Status: DISCONTINUED | OUTPATIENT
Start: 2019-10-10 | End: 2019-10-10 | Stop reason: HOSPADM

## 2019-10-10 RX ORDER — DEXAMETHASONE SODIUM PHOSPHATE 10 MG/ML
INJECTION, SOLUTION INTRAMUSCULAR; INTRAVENOUS AS NEEDED
Status: DISCONTINUED | OUTPATIENT
Start: 2019-10-10 | End: 2019-10-10 | Stop reason: SURG

## 2019-10-10 RX ORDER — MAGNESIUM HYDROXIDE 1200 MG/15ML
LIQUID ORAL AS NEEDED
Status: DISCONTINUED | OUTPATIENT
Start: 2019-10-10 | End: 2019-10-10 | Stop reason: HOSPADM

## 2019-10-10 RX ORDER — OXYCODONE HYDROCHLORIDE 5 MG/1
5 TABLET ORAL EVERY 4 HOURS PRN
Qty: 8 TABLET | Refills: 0 | Status: SHIPPED | OUTPATIENT
Start: 2019-10-10 | End: 2019-10-20

## 2019-10-10 RX ORDER — GLYCOPYRROLATE 0.2 MG/ML
INJECTION INTRAMUSCULAR; INTRAVENOUS AS NEEDED
Status: DISCONTINUED | OUTPATIENT
Start: 2019-10-10 | End: 2019-10-10 | Stop reason: SURG

## 2019-10-10 RX ORDER — OXYCODONE HYDROCHLORIDE 5 MG/1
5 TABLET ORAL EVERY 4 HOURS PRN
Status: DISCONTINUED | OUTPATIENT
Start: 2019-10-10 | End: 2019-10-10 | Stop reason: HOSPADM

## 2019-10-10 RX ORDER — ONDANSETRON 2 MG/ML
4 INJECTION INTRAMUSCULAR; INTRAVENOUS EVERY 6 HOURS PRN
Status: DISCONTINUED | OUTPATIENT
Start: 2019-10-10 | End: 2019-10-10 | Stop reason: HOSPADM

## 2019-10-10 RX ORDER — ONDANSETRON 2 MG/ML
4 INJECTION INTRAMUSCULAR; INTRAVENOUS ONCE AS NEEDED
Status: DISCONTINUED | OUTPATIENT
Start: 2019-10-10 | End: 2019-10-10 | Stop reason: HOSPADM

## 2019-10-10 RX ORDER — FENTANYL CITRATE/PF 50 MCG/ML
25 SYRINGE (ML) INJECTION
Status: DISCONTINUED | OUTPATIENT
Start: 2019-10-10 | End: 2019-10-10 | Stop reason: HOSPADM

## 2019-10-10 RX ORDER — SUCCINYLCHOLINE/SOD CL,ISO/PF 100 MG/5ML
SYRINGE (ML) INTRAVENOUS AS NEEDED
Status: DISCONTINUED | OUTPATIENT
Start: 2019-10-10 | End: 2019-10-10 | Stop reason: SURG

## 2019-10-10 RX ORDER — NEOSTIGMINE METHYLSULFATE 1 MG/ML
INJECTION INTRAVENOUS AS NEEDED
Status: DISCONTINUED | OUTPATIENT
Start: 2019-10-10 | End: 2019-10-10 | Stop reason: SURG

## 2019-10-10 RX ORDER — IBUPROFEN 600 MG/1
600 TABLET ORAL EVERY 6 HOURS PRN
Status: DISCONTINUED | OUTPATIENT
Start: 2019-10-10 | End: 2019-10-10 | Stop reason: HOSPADM

## 2019-10-10 RX ORDER — OXYCODONE HYDROCHLORIDE 5 MG/1
10 TABLET ORAL EVERY 4 HOURS PRN
Status: DISCONTINUED | OUTPATIENT
Start: 2019-10-10 | End: 2019-10-10 | Stop reason: HOSPADM

## 2019-10-10 RX ORDER — ROCURONIUM BROMIDE 10 MG/ML
INJECTION, SOLUTION INTRAVENOUS AS NEEDED
Status: DISCONTINUED | OUTPATIENT
Start: 2019-10-10 | End: 2019-10-10 | Stop reason: SURG

## 2019-10-10 RX ORDER — SODIUM CHLORIDE, SODIUM LACTATE, POTASSIUM CHLORIDE, CALCIUM CHLORIDE 600; 310; 30; 20 MG/100ML; MG/100ML; MG/100ML; MG/100ML
INJECTION, SOLUTION INTRAVENOUS CONTINUOUS PRN
Status: DISCONTINUED | OUTPATIENT
Start: 2019-10-10 | End: 2019-10-10 | Stop reason: SURG

## 2019-10-10 RX ADMIN — IBUPROFEN 600 MG: 600 TABLET ORAL at 11:20

## 2019-10-10 RX ADMIN — ONDANSETRON 4 MG: 2 INJECTION INTRAMUSCULAR; INTRAVENOUS at 08:25

## 2019-10-10 RX ADMIN — NEOSTIGMINE METHYLSULFATE 2.5 MG: 1 INJECTION, SOLUTION INTRAVENOUS at 09:16

## 2019-10-10 RX ADMIN — SODIUM CHLORIDE, SODIUM LACTATE, POTASSIUM CHLORIDE, AND CALCIUM CHLORIDE: .6; .31; .03; .02 INJECTION, SOLUTION INTRAVENOUS at 08:17

## 2019-10-10 RX ADMIN — DEXAMETHASONE SODIUM PHOSPHATE 10 MG: 10 INJECTION, SOLUTION INTRAMUSCULAR; INTRAVENOUS at 08:25

## 2019-10-10 RX ADMIN — FENTANYL CITRATE 25 MCG: 50 INJECTION INTRAMUSCULAR; INTRAVENOUS at 09:39

## 2019-10-10 RX ADMIN — GLYCOPYRROLATE 0.5 MG: 0.2 INJECTION, SOLUTION INTRAMUSCULAR; INTRAVENOUS at 09:16

## 2019-10-10 RX ADMIN — PHENYLEPHRINE HYDROCHLORIDE 100 MCG: 10 INJECTION INTRAVENOUS at 08:21

## 2019-10-10 RX ADMIN — FENTANYL CITRATE 100 MCG: 50 INJECTION, SOLUTION INTRAMUSCULAR; INTRAVENOUS at 08:21

## 2019-10-10 RX ADMIN — MIDAZOLAM 2 MG: 1 INJECTION INTRAMUSCULAR; INTRAVENOUS at 08:12

## 2019-10-10 RX ADMIN — PROPOFOL 140 MG: 10 INJECTION, EMULSION INTRAVENOUS at 08:21

## 2019-10-10 RX ADMIN — ROCURONIUM BROMIDE 30 MG: 50 INJECTION, SOLUTION INTRAVENOUS at 08:25

## 2019-10-10 RX ADMIN — Medication 90 MG: at 08:21

## 2019-10-10 RX ADMIN — LIDOCAINE HYDROCHLORIDE 50 MG: 10 INJECTION, SOLUTION INFILTRATION; PERINEURAL at 08:21

## 2019-10-10 RX ADMIN — PHENYLEPHRINE HYDROCHLORIDE 100 MCG: 10 INJECTION INTRAVENOUS at 09:01

## 2019-10-10 NOTE — OP NOTE
OPERATIVE REPORT  PATIENT NAME: Robert Padilla    :  1975  MRN: [id number]  Pt Location:  OR ROOM 03    SURGERY DATE: 10/10/2019    Surgeon(s) and Role:     * Mathieu Andrea MD - Primary     * Sherlyn Chino MD - Assisting     * Reyna Henderson MD - Assisting    Preop Diagnosis:  Left ovarian cyst [N83 202]    Post-Op Diagnosis Codes:     * Left ovarian cyst [N83 202]    Procedure(s) (LRB):  SALPINGECTOMY, LAPAROSCOPIC (Bilateral)    Specimen(s):  ID Type Source Tests Collected by Time Destination   1 :  Washing Pelvic Washing NON-GYNECOLOGIC CYTOLOGY Mathieu Andrea MD 10/10/2019 0901    2 :  Tissue Fallopian Tubes, Bilateral TISSUE EXAM Mathieu Andrea MD 10/10/2019 09        Estimated Blood Loss: Minimal    Drains: 100cc urine    Anesthesia Type:   General    Operative Indications:  Left ovarian cyst with solid components identified on ultrasound  Menorrhagia  Pelvic pain    Operative Findings:  Grossly normal ovaries and tubes bilaterally  Three subserosal fibroids 1,2 and 5cm respectively  Mild endometriosis    Complications:   None    Procedure and Technique:    Robert Padilla is a 51-year-old female who presents with left ovarian mass with solid components detected incidentally on transvaginal ultrasound   was 4  Endometrial biopsy was reported as proliferative endometrium have smear in 2019 was negative for intraepithelial neoplasia  Patient also desired sterilization  The decision was made to proceed to the OR for bilateral salpingectomy and left ovarian cystectomy versus left oophorectomy  All risks, benefits, and alternatives to the procedure were discussed with the patient and she had the opportunity to ask questions  The risk of regret of procedure was also addressed with the patient and options for LARC was discussed  Patient expressed desire to continue with tubal sterilization  Informed consent was obtained       Patient was taken to the operating room were a time out was performed to confirm correct patient and correct procedure  General endotracheal anesthesia (GET) was administered and the patient was positioned on the OR table in the dorsal lithotomy position  All pressure points were padded and a marni hugger was placed to maintain control of core body temperature  A bimanual exam was performed and the uterus was noted to be midposition, with palpable mass in right posterior fornix  No palpable adnexal masses or fullness  The patient was prepped and draped in the usual sterile fashion with chloroprep on the abdomen and betadine prep on the vagina and perineum  A straight catheter was introduced into the bladder, which was drained of 100cc of clear yellow urine  A weighted speculum was inserted into the vagina and used to visualize the anterior lip of the cervix, which was then grasped with a single toothed tenaculum  A Villanueva dilator was inserted into the cervix and secured to the tenaculum  The speculum was removed from the vagina  Sterile gloves were then exchanged and attention was turned to the abdomen  A 5mm incision was made at the inferior edge of the umbilicus for introduction of a 5mm trocar  Trocar was introduced under direct visualization  Pneumoperitoneum was then established to a maximum of 15mmHg  The entire abdomen and pelvis was inspected and there was no evidence of injury to bowel, bladder, vasculature, or other structures  Attention was then turned to the pelvis  Patient was placed in Trendelenburg and the uterus was elevated to visualize the fallopian tubes  The uterus was noted to have three subserosal fibroids, the largest of these on the right fundus, measuring 5cm in diameter  The pelvis was inspected and a few small lesions were noted that were consistent with endometriosis  There was noted to be grossly normal tubes and ovaries bilaterally  The left ovary was carefully inspected for any signs of a cyst or mass and known was found   Due to the ovaries being grossly normal on both sides, the decision was made to not proceed with left ovarian cystectomy or oophorectomy  A second port site was selected 2cm cephalad to the pubic symphysis  A 5cm incision was made for introduction of a 5cm trocar under direct visualization  Pelvic washings were obtained at this time  A blunt probe was inserted through this port and used to visualize the fimbriated ends of the tubes  A third port site was selected in the same manner on the contralateral side  The left fallopian tube and mesosalpinx were grasped with the Enseal device and cauterized with bipolar electrocautery  This was done starting at the fimbriated edge of the Fallopian tube and working proximally until approximately 2cm from the uterine cornua  Adequate fulguration was visualized following this procedure  The contralateral tube was identified and fulgurated in similar fashion  Adequate fulguration was again confirmed following this procedure  Following fulguration, pneumoperitoneum was allowed to escape  Adequate hemostasis was visualized  The inferior trocar was removed under direct visualization  The laparoscope was withdrawn from the abdomen, followed by its trocar sleeve at the umbilicus  Skin incisions were closed with 4-0 monocryl  Attention was turned to the vagina  Weighted speculum was reinserted into the vagina and the uterine manipulator was withdrawn  Single toothed tenaculum was removed from the anterior lip of the cervix  Good hemostasis was confirmed at the tenaculum puncture sites  Speculum was then removed from the vagina  At the conclusion of the procedure, all needle, sponge, and instrument counts were noted to be correct x2  Patient tolerated the procedure well and was transferred to PACU in stable condition prior to discharge with follow up in 1-2 weeks  Dr Javed Beltran was present and participated in all key portions of the case      Jose Alfredo Valdes MD, PGY-2  10/10/2019  4:05 PM

## 2019-10-10 NOTE — ANESTHESIA POSTPROCEDURE EVALUATION
Post-Op Assessment Note    CV Status:  Stable  Pain Score: 8    Pain management: adequate     Mental Status:  Alert and awake   Hydration Status:  Euvolemic   PONV Controlled:  Controlled   Airway Patency:  Patent   Post Op Vitals Reviewed: Yes      Staff: CRNA           BP   109/68   Temp   97   Pulse  84   Resp   12   SpO2   100%

## 2019-10-10 NOTE — DISCHARGE INSTRUCTIONS
Ovarian Cyst   WHAT YOU NEED TO KNOW:   An ovarian cyst is a sac that grows on an ovary  This sac usually contains fluid, but may sometimes have blood or tissue in it  Most ovarian cysts are harmless and go away without treatment in a few months  Some cysts can grow large, cause pain, or break open  DISCHARGE INSTRUCTIONS:   Call 911 for any of the following:   · You are too weak or dizzy to stand up  Seek care immediately if:   · You have severe abdominal pain  The pain may be sharp and sudden  · You have a fever  Contact your healthcare provider if:   · Your periods are early, late, or more painful than usual     · You have bleeding from your vagina that is not your period  · You have abdominal pain all the time  · Your abdomen is swollen  · You have feelings of fullness, pressure, or discomfort in your abdomen  · You have trouble urinating or emptying your bladder completely  · You have pain during sex  · You are losing weight without trying  · You have questions or concerns about your condition or care  Medicines: You may need any of the following:  · NSAIDs , such as ibuprofen, help decrease swelling, pain, and fever  This medicine is available with or without a doctor's order  NSAIDs can cause stomach bleeding or kidney problems in certain people  If you take blood thinner medicine, always ask if NSAIDs are safe for you  Always read the medicine label and follow directions  Do not give these medicines to children under 10months of age without direction from your child's healthcare provider  · Birth control pills  may help to control your periods, prevent cysts, or cause them to shrink  · Take your medicine as directed  Contact your healthcare provider if you think your medicine is not helping or if you have side effects  Tell him or her if you are allergic to any medicine  Keep a list of the medicines, vitamins, and herbs you take   Include the amounts, and when and why you take them  Bring the list or the pill bottles to follow-up visits  Carry your medicine list with you in case of an emergency  Follow up with your healthcare provider as directed:  Write down your questions so you remember to ask them during your visits  Apply heat to decrease pain and cramping:  Sit in a warm bath, or place a heating pad (turned on low) or a hot water bottle on your abdomen  Do this for 15 to 20 minutes every hour for as many days as directed  © 2017 [employer]  Information is for End User's use only and may not be sold, redistributed or otherwise used for commercial purposes  All illustrations and images included in CareNotes® are the copyrighted property of [employer]  or [employer]  The above information is an  only  It is not intended as medical advice for individual conditions or treatments  Talk to your doctor, nurse or pharmacist before following any medical regimen to see if it is safe and effective for you  Salpingectomy   WHAT YOU NEED TO KNOW:   A salpingectomy is surgery to remove one or both of your fallopian tubes  The fallopian tubes carry eggs from the ovaries to the uterus  They are part of a woman's reproductive system  A salpingectomy may be done to treat an ectopic pregnancy, cancer, endometriosis, or an infection  It may also be done to prevent pregnancy or some types of cancer  DISCHARGE INSTRUCTIONS:   Call 911 for any of the following:   · You feel lightheaded, short of breath, and have chest pain  · You cough up blood  · You have trouble breathing  Seek care immediately if:   · Your arm or leg feels warm, tender, and painful  It may look swollen and red  · Blood soaks through your bandage  · Your stitches come apart  · You soak through 1 sanitary pad in 1 hour  · You have trouble urinating or cannot urinate at all    Contact your healthcare provider if:   · You have a fever or chills  · Your wound is red, swollen, or draining pus  · You have pus or a foul-smelling odor coming from your vagina  · Your pain does not get better after you take your medicine  · You have nausea or are vomiting  · Your skin is itchy, swollen, or you have a rash  · You have questions or concerns about your condition or care  Medicines: You may need any of the following:  · Prescription pain medicine  may be given  Ask your healthcare provider how to take this medicine safely  · NSAIDs , such as ibuprofen, help decrease swelling, pain, and fever  NSAIDs can cause stomach bleeding or kidney problems in certain people  If you take blood thinner medicine, always ask your healthcare provider if NSAIDs are safe for you  Always read the medicine label and follow directions  · Take your medicine as directed  Contact your healthcare provider if you think your medicine is not helping or if you have side effects  Tell him or her if you are allergic to any medicine  Keep a list of the medicines, vitamins, and herbs you take  Include the amounts, and when and why you take them  Bring the list or the pill bottles to follow-up visits  Carry your medicine list with you in case of an emergency  Care for your wound as directed:  Ask your healthcare provider when your wound can get wet  Do not take a bath until your healthcare provider says it is okay  Take a shower only  Carefully wash around the wound with soap and water  Let the soap and water gently run over your incision  Do not  scrub your incision  Dry the area and put on new, clean bandages as directed  Change your bandages when they get wet or dirty  If you have strips of medical tape, let them fall off on their own  Activity:  Ask your healthcare provider when you can return to your normal activities  Do not douche, use tampons, or have sex until your healthcare provider says it is okay  These activities may cause infection   Do not exercise or lift anything heavy until your healthcare provider says it is okay  This may put too much stress on your incision  Follow up with your healthcare provider as directed:  Write down your questions so you remember to ask them during your visits  © 2017 [employer] Information is for End User's use only and may not be sold, redistributed or otherwise used for commercial purposes  All illustrations and images included in CareNotes® are the copyrighted property of [employer] Inc  or [employer]  The above information is an  only  It is not intended as medical advice for individual conditions or treatments  Talk to your doctor, nurse or pharmacist before following any medical regimen to see if it is safe and effective for you

## 2019-10-10 NOTE — INTERVAL H&P NOTE
H&P reviewed  After examining the patient I find no changes in the patients condition since the H&P had been written  Vitals:    10/10/19 0626   BP: 120/79   Pulse: 90   Resp: 20   Temp: 97 9 °F (36 6 °C)   SpO2: 98%   Today is the last day of Micronor  She is aware the Salpingectomy is irreversible  Vasectomy is declined    May need to restart Micronor for AUB

## 2019-10-10 NOTE — PROGRESS NOTES
Discussed with patient once more that bilateral salpingectomy is a sterilization procedure that is PERMANENT and IRREVERSIBLE  Patient expresses understand and desires to proceed with the procedure      Deborah Robles MD, PGY-2  10/10/2019  7:43 AM

## 2019-10-10 NOTE — ANESTHESIA PREPROCEDURE EVALUATION
Review of Systems/Medical History  Patient summary reviewed  Chart reviewed  No history of anesthetic complications     Cardiovascular  Negative cardio ROS Exercise tolerance (METS): >4,     Pulmonary  Smoker cigarette smoker  , Tobacco cessation counseling given ,        GI/Hepatic  Negative GI/hepatic ROS          Negative  ROS        Endo/Other  Negative endo/other ROS      GYN       Hematology  Negative hematology ROS      Musculoskeletal  Negative musculoskeletal ROS        Neurology  Negative neurology ROS      Psychology   Anxiety,     Comment: ETOH Abuse         Physical Exam    Airway    Mallampati score: II  TM Distance: >3 FB  Neck ROM: full     Dental       Cardiovascular  Comment: Negative ROS, Rhythm: regular, Rate: normal, Cardiovascular exam normal    Pulmonary  Pulmonary exam normal Breath sounds clear to auscultation,     Other Findings        Anesthesia Plan  ASA Score- 2     Anesthesia Type- general with ASA Monitors  Additional Monitors:   Airway Plan: ETT  Plan Factors- Patient instructed to abstain from smoking on day of procedure  Patient smoked on day of surgery  Induction- intravenous  Postoperative Plan- Plan for postoperative opioid use  Planned trial extubation    Informed Consent- Anesthetic plan and risks discussed with patient  I personally reviewed this patient with the CRNA  Discussed and agreed on the Anesthesia Plan with the CRNA  Lien Harley

## 2019-10-24 ENCOUNTER — OFFICE VISIT (OUTPATIENT)
Dept: OBGYN CLINIC | Facility: CLINIC | Age: 44
End: 2019-10-24

## 2019-10-24 VITALS
DIASTOLIC BLOOD PRESSURE: 83 MMHG | HEIGHT: 66 IN | HEART RATE: 101 BPM | WEIGHT: 117 LBS | BODY MASS INDEX: 18.8 KG/M2 | SYSTOLIC BLOOD PRESSURE: 119 MMHG

## 2019-10-24 DIAGNOSIS — Z48.89 ENCOUNTER FOR POSTOPERATIVE CARE: Primary | ICD-10-CM

## 2019-10-24 PROBLEM — Z98.890 HISTORY OF FEMALE STERILIZATION: Status: ACTIVE | Noted: 2019-10-24

## 2019-10-24 PROCEDURE — 99213 OFFICE O/P EST LOW 20 MIN: CPT | Performed by: OBSTETRICS & GYNECOLOGY

## 2019-10-24 NOTE — PROGRESS NOTES
ETOH abuse  Congratulated Jeannette on 6 days of sobriety  Encourage continue compliance with alcohol cessation, attending alcoholic anonymous meetings    Encourage OTC medications for itching, reflux-symptoms associated with alcohol cessation    Encourage follow-up with PCP for any further medical management needed with regard to continued sobriety        Tobacco use disorder  Remains not ready to quit, stating that she is focused on her alcohol cessation at the moment  Ongoing discussion     Left ovarian cyst  Resolved per recent operative report  Will call with new onset of pelvic discomfort    History of female sterilization  S/p bilateral salpingectomy  Doing well postoperatively     Jeremias Lind DO    40year old s/p bilateral salpingectomy for postoperative follow-up  Had been scheduled for left ovarian cystectomy, however ovarian cyst not visualized intraoperatively  Without complaints today related to recent abdominal surgery  Reports appropriate bowel and bladder function  Reports undergoing support from Lincoln Lilly for alcohol cessation, now day 6 of sobriety  Reports associated reflux symptoms and itching following alcohol cessation, which are controlled with OTC pepcid/tums and benadryl, respectively  Attending daily AA meetings  Vitals:    10/24/19 1004   BP: 119/83   Pulse: 101     Physical Exam   Constitutional: She is oriented to person, place, and time  She appears well-developed and well-nourished  Cardiovascular: Normal rate, regular rhythm, normal heart sounds and intact distal pulses  Pulmonary/Chest: Effort normal and breath sounds normal  No stridor  No respiratory distress  Abdominal: Soft  Bowel sounds are normal  She exhibits no distension  There is no tenderness  Trocar incisions clean, dry, and intact    Neurological: She is alert and oriented to person, place, and time  Skin: Skin is warm and dry  She is not diaphoretic  Psychiatric: She has a normal mood and affect   Her behavior is normal

## 2019-10-24 NOTE — ASSESSMENT & PLAN NOTE
Congratulated Jeannette on 6 days of sobriety  Encourage continue compliance with alcohol cessation, attending alcoholic anonymous meetings    Encourage OTC medications for itching, reflux-symptoms associated with alcohol cessation    Encourage follow-up with PCP for any further medical management needed with regard to continued sobriety

## 2019-10-24 NOTE — ASSESSMENT & PLAN NOTE
Remains not ready to quit, stating that she is focused on her alcohol cessation at the moment  Ongoing discussion

## 2021-03-17 ENCOUNTER — OFFICE VISIT (OUTPATIENT)
Dept: URGENT CARE | Facility: CLINIC | Age: 46
End: 2021-03-17
Payer: COMMERCIAL

## 2021-03-17 VITALS
SYSTOLIC BLOOD PRESSURE: 127 MMHG | TEMPERATURE: 97.1 F | OXYGEN SATURATION: 98 % | HEART RATE: 110 BPM | RESPIRATION RATE: 20 BRPM | DIASTOLIC BLOOD PRESSURE: 83 MMHG

## 2021-03-17 DIAGNOSIS — F41.9 ANXIETY: ICD-10-CM

## 2021-03-17 DIAGNOSIS — R53.83 FATIGUE, UNSPECIFIED TYPE: ICD-10-CM

## 2021-03-17 DIAGNOSIS — R07.9 CHEST PAIN, UNSPECIFIED TYPE: Primary | ICD-10-CM

## 2021-03-17 DIAGNOSIS — F32.2 CURRENT SEVERE EPISODE OF MAJOR DEPRESSIVE DISORDER WITHOUT PSYCHOTIC FEATURES, UNSPECIFIED WHETHER RECURRENT (HCC): ICD-10-CM

## 2021-03-17 PROCEDURE — 99203 OFFICE O/P NEW LOW 30 MIN: CPT | Performed by: PHYSICIAN ASSISTANT

## 2021-03-17 NOTE — PROGRESS NOTES
[address] [facility]    NAME: Darren Rainey is a 55 y o  female  : 1975    MRN: [id number]  DATE: 2021  TIME: 1:30 PM    Assessment and Plan   Chest pain, unspecified type [R07 9]  1  Chest pain, unspecified type  Transfer to other facility   2  Fatigue, unspecified type  Transfer to other facility   3  Current severe episode of major depressive disorder without psychotic features, unspecified whether recurrent ([address] )  Transfer to other facility   4  Anxiety  Transfer to other facility       Patient Instructions   Patient Instructions     Proceed to [facility] emergency room for further evaluation  Have your friend drive you  Multiple cardiopulmonary risk factors including oral contraceptive, smoking, family history and possible cardiopulmonary       Chief Complaint     Chief Complaint   Patient presents with    Dizziness     Patient states that she woke up shaky, dizzy, and cannot feel the left side of face  Patient thinks its all anxiety       History of Present Illness   Darren Rainey presents to the clinic c/o    54-year-old female with complaint of feeling shaky, dizzy, pain in her chest with rapid heart rate, terrible anxiety since Friday  States that her whole chest hurts with more pain left-sided and then central       She is concerned about heart disease as her dad who has passed away had history of heart attacks  She is concerned about blood clots as her mom had multiple blood clots  Patient herself has no history of abnormal blood clots  She is a smoker and she is on a birth control pill  She states that she has taking Xanax 0 25 mg as needed 0-1 times per day with last dose on  she believes  She does not like to take it however it does help her anxiety  She says when she takes that she feels more tired  She used to take Zoloft but has been off of that for more than 1 year  She goes from crying to angry  She has poor motivation    She calls her anxiety crippling  She works as a  and states that she is better when she has at work however she continues to have this discomfort in her chest   The discomfort is not worse with exertion  No difficulty dressing or bathing other than poor motivation  She also states that she has had this numbness and weakness sensation in her left leg but this is not new  She says that the left side of her face feels different off and on  She reports that she had COVID back in January  She has not been in contact with her primary care provider about her anxiety and depression  Last visit was approximately 2 months ago when she was started on the "mini pill "   She is supposed to see her in approximately 1 month  Dizziness  This is a new problem  The current episode started in the past 7 days  The problem occurs constantly  The problem has been waxing and waning  Associated symptoms include anorexia, fatigue and weakness  Pertinent negatives include no chills, coughing, diaphoresis or fever  Exacerbated by: Not be busy makes symptoms more pronounced  She has tried nothing for the symptoms  Chest Pain   Associated symptoms include dizziness and weakness  Pertinent negatives include no cough, diaphoresis or fever  Review of Systems   Review of Systems   Constitutional: Positive for activity change, appetite change and fatigue  Negative for chills, diaphoresis and fever  Wants to sleep all day  Decreased appetite  Tired  Respiratory: Negative for cough  Gastrointestinal: Positive for anorexia  Musculoskeletal: Negative for gait problem  Neurological: Positive for dizziness and weakness  Loss of sense of taste and smell since COVID in January  Left leg feels weak but this has been ongoing for a couple months  Left upper lip feels tingly at times  Psychiatric/Behavioral: Positive for agitation, confusion, decreased concentration, dysphoric mood and sleep disturbance  Negative for hallucinations, self-injury and suicidal ideas  The patient is nervous/anxious  Current Medications     Long-Term Medications   Medication Sig Dispense Refill    ALPRAZolam (XANAX) 0 25 mg tablet TAKE 1 TABLET 3 TIMES A DAY AS NEEDED      norethindrone (MICRONOR) 0 35 MG tablet Take 1 tablet (0 35 mg total) by mouth daily 30 tablet 2    sertraline (ZOLOFT) 50 mg tablet Take 25 mg by mouth daily       valACYclovir (VALTREX) 500 mg tablet Take at onset of cold sore  Current Allergies     Allergies as of 03/17/2021 - Reviewed 03/17/2021   Allergen Reaction Noted    Lidocaine Other (See Comments) 07/30/2020          The following portions of the patient's history were reviewed and updated as appropriate: allergies, current medications, past family history, past medical history, past social history, past surgical history and problem list   Past Medical History:   Diagnosis Date    Alcohol use     Anxiety disorder     HSV infection      Past Surgical History:   Procedure Laterality Date    NE LAP,RMV  ADNEXAL STRUCTURE Bilateral 10/10/2019    Procedure: SALPINGECTOMY, LAPAROSCOPIC;  Surgeon: Anastacia Boggs MD;  Location: [facility];  Service: Gynecology    THORACOTOMY Left      Family History   Problem Relation Age of Onset    COPD Mother     Stroke Father     Hyperlipidemia Father     Hypertension Father     Heart attack Father     No Known Problems Brother     Cancer Paternal Aunt        Objective   /83   Pulse (!) 110   Temp (!) 97 1 °F (36 2 °C) (Tympanic)   Resp 20   SpO2 98%   No LMP recorded  Physical Exam     Physical Exam  Vitals signs and nursing note reviewed  Constitutional:       General: She is in acute distress  Appearance: She is normal weight  She is not ill-appearing, toxic-appearing or diaphoretic  Comments: Anxious and tearful but in no acute respiratory distress  Speech is rapid  She has mild tremulousness    No conversational dyspnea  HENT:      Head: Atraumatic  Eyes:      General: No scleral icterus  Extraocular Movements: Extraocular movements intact  Pupils: Pupils are equal, round, and reactive to light  Neck:      Musculoskeletal: Normal range of motion and neck supple  No neck rigidity or muscular tenderness  Cardiovascular:      Rate and Rhythm: Regular rhythm  Tachycardia present  Heart sounds: Normal heart sounds  No murmur  No friction rub  No gallop  Pulmonary:      Effort: Pulmonary effort is normal  No respiratory distress  Breath sounds: No stridor  Rhonchi present  No wheezing or rales  Comments: Mild sonorous rhonchi right upper lung field  Abdominal:      Palpations: Abdomen is soft  Tenderness: There is no abdominal tenderness  Musculoskeletal:      Right lower leg: No edema  Left lower leg: No edema  Lymphadenopathy:      Cervical: No cervical adenopathy  Skin:     General: Skin is warm and dry  Neurological:      Mental Status: She is alert  Psychiatric:         Attention and Perception: Attention normal  She is attentive  She does not perceive auditory or visual hallucinations  Mood and Affect: Mood is anxious and depressed  Mood is not elated  Affect is flat and tearful  Affect is not labile, angry or inappropriate  Speech: She is communicative  Speech is rapid and pressured  Speech is not delayed, slurred or tangential          Behavior: Behavior is hyperactive  Behavior is cooperative  Thought Content: Thought content is not paranoid or delusional  Thought content does not include homicidal or suicidal ideation  Thought content does not include homicidal or suicidal plan           Cognition and Memory: Cognition normal          Judgment: Judgment normal

## 2021-03-17 NOTE — PATIENT INSTRUCTIONS
Proceed to [facility] emergency room for further evaluation  Have your friend drive you      Multiple cardiopulmonary risk factors including oral contraceptive, smoking, family history and possible cardiopulmonary

## 2024-06-21 NOTE — PATIENT INSTRUCTIONS
AUTHORIZATION FOR RELEASE OF   CONFIDENTIAL INFORMATION    Dr. Damon,    We are seeing Selin Botello, date of birth 1974, in the clinic at [facility]. Rocio Bain MD is the patient's PCP. Selin Botello has an outstanding lab/procedure at the time we reviewed her chart. In order to help keep her health information updated, she has authorized us to request the following medical record(s):                                                                           ( xx )  [facility]                                               Please fax records to [facility], Yunus, Asfiya, [place], [phone].     If you have any questions, please contact Kathy Mcguire, Care Coordinator  at [phone].               Patient Name: Selin Botello  : 1974  Patient Phone #: [phone]      Ovarian Cyst   WHAT YOU NEED TO KNOW:   What is an ovarian cyst?  An ovarian cyst is a sac that grows on an ovary  This sac usually contains fluid, but may sometimes have blood or tissue in it  Most ovarian cysts are harmless and go away without treatment in a few months  Some cysts can grow large, cause pain, or break open  What causes an ovarian cyst?  Most ovarian cysts form during or after ovulation  Right before ovulation, your ovary forms a follicle  A follicle is a fluid-filled blister that has an egg inside  During ovulation, the follicle breaks open and releases the egg  A cyst may form if the follicle does not break open to release the egg  A cyst may form if the follicle opens to release the egg, but then closes and collects fluid  Endometriosis can also cause a cyst to form  Endometriosis is a condition in which tissue from your uterus grows on your ovaries  Some of this tissue may develop into a cyst   What are the signs and symptoms of an ovarian cyst?  You may not feel anything or know that you have a cyst, or you may have any of the following:  · Severe pain in your lower abdomen and pelvic area that may be sharp and sudden or feel like a dull ache    · Fullness and swelling in your lower abdomen     · Infertility (not able to get pregnant)    · Late or painful periods    · Small amounts of bleeding between your periods    · Lower abdominal or pelvic pain during sex    · Nausea or vomiting  How is an ovarian cyst diagnosed? · Blood tests  may include hormone levels, tests for cancer, and a pregnancy test      · Ultrasound pictures  may show a cyst on your ovary  For this test, an ultrasound wand is inserted into your vagina and guided up toward your uterus  This helps your healthcare provider get a closer look at your ovaries  How is an ovarian cyst treated? Treatment will depend on your age, test results, and the kind of cyst you have   Your healthcare provider may wait to see if your ovarian cyst will go away without treatment  You may be given hormone medicine, such as birth control pills  This medicine may help to control your periods, shrink a cyst, and prevent new cysts from forming  You may need surgery to have the cyst removed  Call 911 for any of the following:   · You are too weak or dizzy to stand up  When should I seek immediate care? · You have severe abdominal pain  The pain may be sharp and sudden  · You have a fever  When should I contact my healthcare provider? · Your periods are early, late, or more painful than usual     · You have bleeding from your vagina that is not your period  · You have abdominal pain all the time  · Your abdomen is swollen  · You have feelings of fullness, pressure, or discomfort in your abdomen  · You have trouble urinating or emptying your bladder completely  · You have pain during intercourse  · You are losing weight without trying  · You have questions or concerns about your condition or care  CARE AGREEMENT:   You have the right to help plan your care  Learn about your health condition and how it may be treated  Discuss treatment options with your caregivers to decide what care you want to receive  You always have the right to refuse treatment  The above information is an  only  It is not intended as medical advice for individual conditions or treatments  Talk to your doctor, nurse or pharmacist before following any medical regimen to see if it is safe and effective for you  © 2017 [employer] Information is for End User's use only and may not be sold, redistributed or otherwise used for commercial purposes  All illustrations and images included in CareNotes® are the copyrighted property of [employer]  or [employer]

## (undated) DEVICE — TROCAR: Brand: KII® SLEEVE

## (undated) DEVICE — PACK PBDS MINOR GYN LAP RF

## (undated) DEVICE — NEEDLE 25G X 1 1/2

## (undated) DEVICE — INTENDED FOR TISSUE SEPARATION, AND OTHER PROCEDURES THAT REQUIRE A SHARP SURGICAL BLADE TO PUNCTURE OR CUT.: Brand: BARD-PARKER SAFETY BLADES SIZE 11, STERILE

## (undated) DEVICE — PVC URETHRAL CATHETER: Brand: DOVER

## (undated) DEVICE — ADHESIVE SKIN HIGH VISCOSITY EXOFIN MICRO 0.5ML

## (undated) DEVICE — GLOVE INDICATOR PI UNDERGLOVE SZ 7.5 BLUE

## (undated) DEVICE — ENSEAL LAPAROSCOPIC TISSUE SEALER G2 CURVED JAW FOR USE WITH G2 GENERATOR 5MM DIAMETER 35CM SHAFT LENGTH: Brand: ENSEAL

## (undated) DEVICE — TROCAR: Brand: KII FIOS FIRST ENTRY

## (undated) DEVICE — SUT MONOCRYL 4-0 PS-2 18 IN Y496G

## (undated) DEVICE — CHLORAPREP HI-LITE 26ML ORANGE

## (undated) DEVICE — GLOVE PI ULTRA TOUCH SZ.7.0

## (undated) DEVICE — IRRIG ENDO FLO TUBING

## (undated) DEVICE — 3000CC GUARDIAN II: Brand: GUARDIAN